# Patient Record
Sex: FEMALE | Race: WHITE | Employment: OTHER | URBAN - NONMETROPOLITAN AREA
[De-identification: names, ages, dates, MRNs, and addresses within clinical notes are randomized per-mention and may not be internally consistent; named-entity substitution may affect disease eponyms.]

---

## 2023-11-23 ENCOUNTER — HOSPITAL ENCOUNTER (EMERGENCY)
Age: 83
Discharge: HOME | End: 2023-11-23
Attending: STUDENT IN AN ORGANIZED HEALTH CARE EDUCATION/TRAINING PROGRAM
Payer: MEDICARE

## 2023-11-23 ENCOUNTER — APPOINTMENT (OUTPATIENT)
Dept: GENERAL RADIOLOGY | Age: 83
End: 2023-11-23
Payer: MEDICARE

## 2023-11-23 VITALS
TEMPERATURE: 97.8 F | HEART RATE: 84 BPM | DIASTOLIC BLOOD PRESSURE: 79 MMHG | RESPIRATION RATE: 18 BRPM | SYSTOLIC BLOOD PRESSURE: 190 MMHG | OXYGEN SATURATION: 96 %

## 2023-11-23 DIAGNOSIS — S52.501A CLOSED FRACTURE OF DISTAL END OF RIGHT RADIUS, UNSPECIFIED FRACTURE MORPHOLOGY, INITIAL ENCOUNTER: Primary | ICD-10-CM

## 2023-11-23 PROCEDURE — 73110 X-RAY EXAM OF WRIST: CPT

## 2023-11-23 PROCEDURE — 99283 EMERGENCY DEPT VISIT LOW MDM: CPT

## 2023-11-23 PROCEDURE — 6370000000 HC RX 637 (ALT 250 FOR IP): Performed by: STUDENT IN AN ORGANIZED HEALTH CARE EDUCATION/TRAINING PROGRAM

## 2023-11-23 RX ORDER — OXYCODONE HYDROCHLORIDE 5 MG/1
5 TABLET ORAL ONCE
Status: COMPLETED | OUTPATIENT
Start: 2023-11-23 | End: 2023-11-23

## 2023-11-23 RX ORDER — ACETAMINOPHEN 500 MG
1000 TABLET ORAL ONCE
Status: COMPLETED | OUTPATIENT
Start: 2023-11-23 | End: 2023-11-23

## 2023-11-23 RX ADMIN — OXYCODONE 5 MG: 5 TABLET ORAL at 21:25

## 2023-11-23 RX ADMIN — OXYCODONE 5 MG: 5 TABLET ORAL at 19:26

## 2023-11-23 RX ADMIN — ACETAMINOPHEN 1000 MG: 500 TABLET ORAL at 19:26

## 2023-11-23 ASSESSMENT — PAIN SCALES - GENERAL
PAINLEVEL_OUTOF10: 7
PAINLEVEL_OUTOF10: 8
PAINLEVEL_OUTOF10: 8

## 2023-11-23 ASSESSMENT — PAIN DESCRIPTION - FREQUENCY: FREQUENCY: CONTINUOUS

## 2023-11-23 ASSESSMENT — PAIN DESCRIPTION - DESCRIPTORS: DESCRIPTORS: ACHING

## 2023-11-23 ASSESSMENT — PAIN DESCRIPTION - PAIN TYPE: TYPE: ACUTE PAIN

## 2023-11-23 ASSESSMENT — ENCOUNTER SYMPTOMS
RESPIRATORY NEGATIVE: 1
GASTROINTESTINAL NEGATIVE: 1

## 2023-11-23 ASSESSMENT — PAIN DESCRIPTION - ORIENTATION: ORIENTATION: RIGHT

## 2023-11-23 ASSESSMENT — PAIN - FUNCTIONAL ASSESSMENT: PAIN_FUNCTIONAL_ASSESSMENT: 0-10

## 2023-11-23 ASSESSMENT — PAIN DESCRIPTION - LOCATION: LOCATION: WRIST

## 2023-11-24 NOTE — DISCHARGE INSTRUCTIONS
You have a fracture of your right wrist.  Please follow-up with an orthopedic surgeon and your primary care doctor once you get back to [place]. If the pain becomes too much to bear please return to [facility] emergency department.

## 2023-11-24 NOTE — ED PROVIDER NOTES
or lose consciousness. Given this, we will x-ray the wrist and pain control and await the results from radiology for treatment. Given the swelling, will have nursing staff place her in a cock-up splint     Amount and/or Complexity of Data Reviewed  Radiology: ordered. Risk  OTC drugs. Prescription drug management. EKG      All EKG's are interpreted by the Emergency Department Physician who either signs or Co-signs this chart in the absence of a cardiologist.    EMERGENCY DEPARTMENT COURSE:      ED Course as of 11/23/23 2119   Thu Nov 23, 2023 2114 Patient does have a distal radius fracture with some palmar angulation. Will have her placed in a wrist cock up splint and have her follow-up with orthopedic surgery back in [place]. [ES]      ED Course User Index  [ES] Agustin Palma MD       PROCEDURES:      CONSULTS:  None    CRITICAL CARE:  There was significant risk of life threatening deterioration of patient's condition requiring my direct management. Critical care time 0 minutes, excluding any documented procedures. FINAL IMPRESSION      1.  Closed fracture of distal end of right radius, unspecified fracture morphology, initial encounter          DISPOSITION / PLAN     DISPOSITION Decision To Discharge 11/23/2023 09:15:01 PM      PATIENT REFERRED TO:  [facility] ED  [address] [facility]  [phone]  Go to   If symptoms worsen      DISCHARGE MEDICATIONS:  New Prescriptions    No medications on file       Agustin Palma MD  Emergency Medicine Physician     (Please note that portions of thisnote were completed with a voice recognition program.  Efforts were made to edit the dictations but occasionally words are mis-transcribed.)       Agustin Palma MD  Resident  11/23/23 2119

## 2024-01-08 ENCOUNTER — HOSPITAL ENCOUNTER (OUTPATIENT)
Dept: OCCUPATIONAL THERAPY | Age: 84
Setting detail: THERAPIES SERIES
Discharge: HOME | End: 2024-01-08
Payer: MEDICARE

## 2024-01-08 PROCEDURE — 97110 THERAPEUTIC EXERCISES: CPT

## 2024-01-08 PROCEDURE — 97140 MANUAL THERAPY 1/> REGIONS: CPT

## 2024-01-08 PROCEDURE — 97166 OT EVAL MOD COMPLEX 45 MIN: CPT

## 2024-01-08 NOTE — PLAN OF CARE
Phone: [phone]                      [facility]           Fax: [phone]                           Outpatient Occupational Therapy                                                                            Initial Evaluation      Name:  Latoya SALVADOR.: [birth year]   Date: 2024  Referring Physician: Dr. Marquez  Medical Diagnosis: S52.551D - Other extraarticular fracture of the lower end of R radius  Rehab Diagnosis/ICD-10#s:Weakness, M62.81; S52.551D - Other extraarticular fracture of the lower end of R radius  Insurance: OT Insurance Information: Medicare  Total # of Visits to Date: 1  Next  Appointment: 4 weeks  Chief Complaint: R wrist fracture and pain  [facility] #: [id number]  Onset Date: 23    Mechanism of Injury: FOOSH  Surgery Date: n/a    Precautions:   [x]None  [] Fall Risk  []WB Status  [] Pacemaker   []Other:            Involved Extremity:      [] Left [x] Right  Dominant: [] Left [x]Right    Work Status:   [] Normal [] Restricted [] Off D/T Injury/Condition [x] Retired [] Unemployed [] Disabled []Other:  Critical Job/Daily Tasks:       Subjective:  Patient presents this date s/p [facility] with R distal radius fracture. Initial injury happened on 23 when she fell while walking. Patient was casted x 5 weeks and now presents with limited motion at wrist and forearm. Patient also still having pain and swelling. Patient concerned as pain is on ulnar wrist side vs. Radial where she fractured. Patient is originally from [place], however, currently staying with sister in [place]. Patient plans to return home in [place], where she lives alone in 2nd floor apartment with no elevator.    PMH: HTN, CA (scalp with removal), OA    Pain: Intensity:   9/10 Location:  R wrist  Pain Type: [x] Constant [x] Intermittent   [  ] with pain meds at rest   [x] With movement/Resistive activity [] With Sedentary activity      Objective:    WRIST AROM Right Left   Wrist Flexion 30 60   Wrist Extension

## 2024-01-10 ENCOUNTER — HOSPITAL ENCOUNTER (OUTPATIENT)
Dept: OCCUPATIONAL THERAPY | Age: 84
Setting detail: THERAPIES SERIES
Discharge: HOME | End: 2024-01-10
Payer: MEDICARE

## 2024-01-10 PROCEDURE — 97140 MANUAL THERAPY 1/> REGIONS: CPT

## 2024-01-10 PROCEDURE — 97022 WHIRLPOOL THERAPY: CPT

## 2024-01-10 PROCEDURE — 97110 THERAPEUTIC EXERCISES: CPT

## 2024-01-10 NOTE — PROGRESS NOTES
assistance  []Needs additional instruction to demonstrate understanding of education    ASSESSMENT  Patient tolerated today’s treatment session:    [x] Good   []  Fair   []  Poor  Limitations/difficulties with treatment session due to:   []Pain     []Fatigue     []Other medical complications     []Other  Goal Assessment: [] No Change    [x]Improved    HEP  Patient is to be completing/ issued the following handouts/exercises since SOC:           HEP Weight/  Level Reps/Time Comments    Putty Yellow 2x/day 10 min sessions Focus mostly on    Contrast baths x 2x/day for 10 min To reduce pain and swelling   Thumb ROM x 2x/day 2 sets of 10 reps with 5 second hold To improve thumb ROM   6 pack x 2x/day 10 reps each To improve thumb ROM with PAB and RAB   Wrist ROM x 2x/day 2 sets of 10 reps with 5 second hold To improve all wrist ROM                   Minutes Tracking:  Time In: 1435  Time Out: 1520  Minutes: 45  Timed Code Treatment Minutes: 43 Minutes        PLAN  [x]Continue with current plan of care  []Medical “Hold”  []I“Hold” per patient request  [] Change Treatment plan:  [] Insurance hold  __ Other        Electronically signed by ELO Smallwood/L,CHT  1/10/2024 5:33 PM

## 2024-01-12 ENCOUNTER — HOSPITAL ENCOUNTER (OUTPATIENT)
Dept: OCCUPATIONAL THERAPY | Age: 84
Setting detail: THERAPIES SERIES
Discharge: HOME | End: 2024-01-12
Payer: MEDICARE

## 2024-01-12 PROCEDURE — 97110 THERAPEUTIC EXERCISES: CPT

## 2024-01-12 PROCEDURE — 97140 MANUAL THERAPY 1/> REGIONS: CPT

## 2024-01-12 PROCEDURE — 97018 PARAFFIN BATH THERAPY: CPT

## 2024-01-12 NOTE — PROGRESS NOTES
Phone: [phone]                 [facility]    Fax: [phone]                       Outpatient Occupational Therapy                 DAILY TREATMENT NOTE    Date: 1/12/2024  Patient’s Name:  Latoya Alex  YOB: 1940 (83 y.o.)  Gender:  female  MRN:  [id number]  [facility] #: [id number]  Medical Diagnosis: S52.551D - Other extraarticular fracture of the lower end of R radius    Referring Physician: Mt Marquez MD     INSURANCE  OT Insurance Information: Medicare       Total # of Visits to Date: 3       PAIN  []No     [x]Yes      Location: R wrist  Pain Rating (0-10 pain scale): 6/10  Pain Description:     SUBJECTIVE   Patient states that she is doing better, things are getting easier for her to do.             Flow Sheet   Exercise /   Manual treatment Weight/  Level Reps/Time Comments    Joint mobs and distractions x x R wrist and digits to for pain and improvement with ROM    PROM c stretch x x R wrist and digits to improve ROM    Maze x x10 R wrist to improve ROM    DTM arch x x10 R hand to improve functional ROM    PRO/SUP wheel x x10 R forearm to improve PRO/SUP   Proprioception x x10 Frisbee with rolling ball to alphabet                                                                                                                                               Modality Flow Sheet:   START STOP Tx Modality       Electrical Stim:          Ultrasound: ___ W/cm2 x ___ mins  Duty factor: __100%  __50%  __20% __10%  Head size:   MHz: __1mHz __2 mHz  __3mHz  Location:      10 minutes Hot Pack: R hand for pain and stiffness; combined c paraffin. Patient tolerated well c skin intact pre and post treatment.     10 minutes  Paraffin:R hand for pain and stiffness; combined c HP. Patient tolerated well c skin intact pre and post treatment.      Fludiotherapy: R hand at moderate heat and agitation for pain and swelling. Patient tolerated well c skin intact pre and post treatment.

## 2024-01-15 ENCOUNTER — HOSPITAL ENCOUNTER (OUTPATIENT)
Dept: OCCUPATIONAL THERAPY | Age: 84
Setting detail: THERAPIES SERIES
Discharge: HOME | End: 2024-01-15
Payer: MEDICARE

## 2024-01-15 PROCEDURE — 97140 MANUAL THERAPY 1/> REGIONS: CPT

## 2024-01-15 PROCEDURE — 97110 THERAPEUTIC EXERCISES: CPT

## 2024-01-15 PROCEDURE — 97018 PARAFFIN BATH THERAPY: CPT

## 2024-01-15 NOTE — PROGRESS NOTES
Phone: [phone]                 [facility]    Fax: [phone]                       Outpatient Occupational Therapy                 DAILY TREATMENT NOTE    Date: 1/15/2024  Patient’s Name:  Latoya Alex  YOB: 1940 (83 y.o.)  Gender:  female  MRN:  [id number]  [facility] #: [id number]  Medical Diagnosis: S52.551D - Other extraarticular fracture of the lower end of R radius    Referring Physician: Mt Marquez MD     INSURANCE  OT Insurance Information: Medicare       Total # of Visits to Date: 4       PAIN  []No     [x]Yes      Location: R wrist  Pain Rating (0-10 pain scale): 6/10  Pain Description:     SUBJECTIVE   Patient states increased pain on dorsal aspect of hand, along 3rd metacarpal             Flow Sheet   Exercise /   Manual treatment Weight/  Level Reps/Time Comments    Joint mobs and distractions x x R wrist and digits to for pain and improvement with ROM    PROM c stretch x x R wrist and digits to improve ROM    Maze x x10 R wrist to improve ROM    DTM arch x x20 R hand to improve functional ROM    PRO/SUP wheel x x20 R forearm to improve PRO/SUP   Proprioception   Frisbee with rolling ball to alphabet                                                                                                                                                                Modality Flow Sheet:   START STOP Tx Modality       Electrical Stim:          Ultrasound: ___ W/cm2 x ___ mins  Duty factor: __100%  __50%  __20% __10%  Head size:   MHz: __1mHz __2 mHz  __3mHz  Location:      10 minutes Hot Pack: R hand for pain and stiffness; combined c paraffin. Patient tolerated well c skin intact pre and post treatment.     10 minutes  Paraffin:R hand for pain and stiffness; combined c HP. Patient tolerated well c skin intact pre and post treatment.       Fludiotherapy: R hand at moderate heat and agitation for pain and swelling. Patient tolerated well c skin intact pre and post treatment.

## 2024-01-17 ENCOUNTER — HOSPITAL ENCOUNTER (OUTPATIENT)
Dept: OCCUPATIONAL THERAPY | Age: 84
Setting detail: THERAPIES SERIES
Discharge: HOME | End: 2024-01-17
Payer: MEDICARE

## 2024-01-17 PROCEDURE — 97140 MANUAL THERAPY 1/> REGIONS: CPT

## 2024-01-17 PROCEDURE — 97110 THERAPEUTIC EXERCISES: CPT

## 2024-01-17 PROCEDURE — 97035 APP MDLTY 1+ULTRASOUND EA 15: CPT

## 2024-01-17 NOTE — PROGRESS NOTES
and stiffness; combined c paraffin. Patient tolerated well c skin intact pre and post treatment.     10 minutes  Paraffin:R hand for pain and stiffness; combined c HP. Patient tolerated well c skin intact pre and post treatment.       Fludiotherapy: R hand at moderate heat and agitation for pain and swelling. Patient tolerated well c skin intact pre and post treatment.       Dry Needling: ___\" needle to superficial radial, deep radial and  lataeral antebrachial cutaneous at homeostatic neuro- trigger points to...   ___No manipulation/static  ___Basic Manipulation  ___Pistoning Manipulation  ___Needle Rotation  ___Tenting                       GOALS   Time Frame for Long Term Goals : 4 weeks       Long Term Goal 1: Patient to state <20% impairment throughout daily tasks, as meausred by the DASH. Continue []Met  [x]Partially met  []Not met   Long Term Goal 2: Patient to improve R forearm supination to 65, wrist flexion to 55, extension to 65, UD to 25 and RD to 20 to improve functional use throughout I/ADL. Continue     UD: 20  RD: 10 []Met  [x]Partially met  []Not met   Long Term Goal 3: Patient to improve R  to 20# to improve independence c daily I/ADL. Continue   R 14#   L 19# []Met  [x]Partially met  []Not met   Long Term Goal 4: Patient to state pain <3/10 at the worst throughout daily tasks to improve QOL. Continue []Met  [x]Partially met  []Not met   Long Term Goal 5: Patient to demonstate decreased edema R wrist to 15.5, thumb to 6.0 and index to 6.2 to improve ROM. Continue  16.5 wrist []Met  [x]Partially met  []Not met   Time Frame for Short Term Goals: 3 weeks       Short Term Goal 1: Patient to be educated on HEP to improve R wrist and hand function. Patient educated on HEP. Continue to update as patient progresses.  [x]Met  []Partially met  []Not met   Short Term Goal 2: Patient to improve R wrist flexion and extension to 50 to improve functional use. Continue     Extension Met []Met  [x]Partially

## 2024-01-19 ENCOUNTER — HOSPITAL ENCOUNTER (OUTPATIENT)
Dept: OCCUPATIONAL THERAPY | Age: 84
Setting detail: THERAPIES SERIES
Discharge: HOME | End: 2024-01-19
Payer: MEDICARE

## 2024-01-19 NOTE — PROGRESS NOTES
[facility]  Inpatient/Observation/Outpatient Rehabilitation    Date: 2024  Patient Name: Latoya Alex       [] Inpatient Acute/Observation       [x]  Outpatient  : 1940       [] Pt no showed for scheduled appointment    [] Pt refused/declined therapy at this time due to:           [x] Pt cancelled due to:  [] No Reason Given   [] Sick/ill   [x] Other: Poor weather conditions    Pt called office to Cx appt.      LALO Steinberg Date: 2024

## 2024-01-22 ENCOUNTER — HOSPITAL ENCOUNTER (OUTPATIENT)
Dept: OCCUPATIONAL THERAPY | Age: 84
Setting detail: THERAPIES SERIES
Discharge: HOME | End: 2024-01-22
Payer: MEDICARE

## 2024-01-22 PROCEDURE — 97018 PARAFFIN BATH THERAPY: CPT

## 2024-01-22 PROCEDURE — 97110 THERAPEUTIC EXERCISES: CPT

## 2024-01-22 PROCEDURE — 97140 MANUAL THERAPY 1/> REGIONS: CPT

## 2024-01-22 NOTE — PROGRESS NOTES
Phone: [phone]                 [facility]    Fax: [phone]                       Outpatient Occupational Therapy                 DAILY TREATMENT NOTE    Date: 1/22/2024  Patient’s Name:  Latoya Alex  YOB: 1940 (83 y.o.)  Gender:  female  MRN:  [id number]  [facility] #: [id number]  Medical Diagnosis: S52.551D - Other extraarticular fracture of the lower end of R radius    Referring Physician: Mt Marquez MD     INSURANCE  OT Insurance Information: Medicare       Total # of Visits to Date: 6       PAIN  []No     [x]Yes      Location: R wrist  Pain Rating (0-10 pain scale): 4/10 with movement  Pain Description:     SUBJECTIVE   Patient states that she drove today, no issues other than unbuckling seatbelt. Also reported that she helped her sister clear out a room, was able to use hand without much pain, however, was very swollen after.             Flow Sheet   Exercise /   Manual treatment Weight/  Level Reps/Time Comments    Joint mobs and distractions x x R wrist and digits to for pain and improvement with ROM    PROM c stretch x x R wrist and digits to improve ROM    Maze   R wrist to improve ROM    DTM arch     R hand to improve functional ROM    PRO/SUP wheel   R forearm to improve PRO/SUP   Proprioception     Frisbee with rolling ball to alphabet     power web Red x20 R wrist strength     flexbar Yellow X10-20 Twists and bends to improve strength    Egg Red X15-20  and pinches to improve strength    Putty Yellow 3' Putty tools to improve strength and functional use.                                                                                                                         Modality Flow Sheet:   START STOP Tx Modality       Electrical Stim:         Ultrasound: __.8_ W/cm2 x __8_ mins  Duty factor: _x_100%  __50%  __20% __10%  Head size:   MHz: __1mHz __2 mHz  _x_3mHz  Location: R wrist, ulnar aspect, and dorsal of hand to reduce pain and swelling, increase

## 2024-01-24 ENCOUNTER — HOSPITAL ENCOUNTER (OUTPATIENT)
Dept: OCCUPATIONAL THERAPY | Age: 84
Setting detail: THERAPIES SERIES
Discharge: HOME | End: 2024-01-24
Payer: MEDICARE

## 2024-01-24 PROCEDURE — 97140 MANUAL THERAPY 1/> REGIONS: CPT

## 2024-01-24 PROCEDURE — 97110 THERAPEUTIC EXERCISES: CPT

## 2024-01-24 PROCEDURE — 97018 PARAFFIN BATH THERAPY: CPT

## 2024-01-24 NOTE — PROGRESS NOTES
minutes Hot Pack: R hand for pain and stiffness; combined c paraffin. Patient tolerated well c skin intact pre and post treatment.     10 minutes  Paraffin:R hand for pain and stiffness; combined c HP. Patient tolerated well c skin intact pre and post treatment.       Fludiotherapy: R hand at moderate heat and agitation for pain and swelling. Patient tolerated well c skin intact pre and post treatment.       Dry Needling: ___\" needle to superficial radial, deep radial and  lataeral antebrachial cutaneous at homeostatic neuro- trigger points to...   ___No manipulation/static  ___Basic Manipulation  ___Pistoning Manipulation  ___Needle Rotation  ___Tenting                       GOALS   Time Frame for Long Term Goals : 4 weeks       Long Term Goal 1: Patient to state <20% impairment throughout daily tasks, as meausred by the DASH. Continue []Met  [x]Partially met  []Not met   Long Term Goal 2: Patient to improve R forearm supination to 65, wrist flexion to 55, extension to 65, UD to 25 and RD to 20 to improve functional use throughout I/ADL. Continue  Flexion: 44  Extension: 61  UD: 35 - MET  RD: 17  SUP: 60    []Met  [x]Partially met  []Not met   Long Term Goal 3: Patient to improve R  to 20# to improve independence c daily I/ADL. Continue     []Met  [x]Partially met  []Not met   Long Term Goal 4: Patient to state pain <3/10 at the worst throughout daily tasks to improve QOL. Continue []Met  [x]Partially met  []Not met   Long Term Goal 5: Patient to demonstate decreased edema R wrist to 15.5, thumb to 6.0 and index to 6.2 to improve ROM. Continue   []Met  [x]Partially met  []Not met   Time Frame for Short Term Goals: 3 weeks       Short Term Goal 1: Patient to be educated on HEP to improve R wrist and hand function. Patient educated on HEP. Continue to update as patient progresses.  [x]Met  []Partially met  []Not met   Short Term Goal 2: Patient to improve R wrist flexion and extension to 50 to improve functional

## 2024-01-26 ENCOUNTER — HOSPITAL ENCOUNTER (OUTPATIENT)
Dept: OCCUPATIONAL THERAPY | Age: 84
Setting detail: THERAPIES SERIES
Discharge: HOME | End: 2024-01-26
Payer: MEDICARE

## 2024-01-26 PROCEDURE — 97018 PARAFFIN BATH THERAPY: CPT

## 2024-01-26 PROCEDURE — 97140 MANUAL THERAPY 1/> REGIONS: CPT

## 2024-01-26 PROCEDURE — 97110 THERAPEUTIC EXERCISES: CPT

## 2024-01-26 NOTE — PROGRESS NOTES
to improve R wrist and hand function. Patient educated on HEP. Continue to update as patient progresses.  [x]Met  []Partially met  []Not met   Short Term Goal 2: Patient to improve R wrist flexion and extension to 50 to improve functional use. Continue  Flexion: 44  Extension Met []Met  [x]Partially met  []Not met   Short Term Goal 3: Patient to improve forearm supination to 55 to improve ROM for functional use. Met    [x]Met  []Partially met  []Not met   Short Term Goal 4: Patient to improve R thumb PAB and RAB to 11.0 cm to improve ROM for incraesed independence c ADL. Met    [x]Met  []Partially met  []Not met   ADDITIONAL COMMENTS             EDUCATION  New Education provided to patient/family/caregiver:    [x]Yes:     []No (Continued review of prior education)   If yes Education Provided: wrist HEP x 3 planes with bottle of water    Method of Education:     [x]Discussion     [x]Demonstration    [] Written     []Other  Evaluation of Patient’s Response to Education:         []Patient and or caregiver verbalized understanding  []Patient and or Caregiver Demonstrated without assistance   [x]Patient and or Caregiver Demonstrated with assistance  []Needs additional instruction to demonstrate understanding of education    ASSESSMENT  Patient tolerated today’s treatment session:    [x] Good   []  Fair   []  Poor  Limitations/difficulties with treatment session due to:   []Pain     []Fatigue     []Other medical complications     []Other  Goal Assessment: [] No Change    [x]Improved      Therapists observations or comments: Improvements noticed in fxl use    HEP  Patient is to be completing/ issued the following handouts/exercises since SOC:           HEP Weight/  Level Reps/Time Comments    Putty Yellow 2x/day 10 min sessions Focus mostly on    Contrast baths x 2x/day for 10 min To reduce pain and swelling   Thumb ROM x 2x/day 2 sets of 10 reps with 5 second hold To improve thumb ROM   6 pack x 2x/day 10 reps each To

## 2024-01-29 ENCOUNTER — HOSPITAL ENCOUNTER (OUTPATIENT)
Dept: OCCUPATIONAL THERAPY | Age: 84
Setting detail: THERAPIES SERIES
Discharge: HOME | End: 2024-01-29
Payer: MEDICARE

## 2024-01-29 PROCEDURE — 97018 PARAFFIN BATH THERAPY: CPT

## 2024-01-29 PROCEDURE — 97140 MANUAL THERAPY 1/> REGIONS: CPT

## 2024-01-29 PROCEDURE — 97110 THERAPEUTIC EXERCISES: CPT

## 2024-01-29 NOTE — PROGRESS NOTES
assist with pulling herself up stairs. Trialed this date to monitor pain and strength. Patient stated minimal pain, completed x 4 steps with CGA.                                                                                          Modality Flow Sheet:   START STOP Tx Modality       Electrical Stim:          Ultrasound: __.8_ W/cm2 x __8_ mins  Duty factor: _x_100%  __50%  __20% __10%  Head size:   MHz: __1mHz __2 mHz  _x_3mHz  Location: R wrist, ulnar aspect, and dorsal of hand to reduce pain and swelling, increase ease of fxl tasks. Pt tolerated well with skin intact pre and post      10 minutes Hot Pack: R hand for pain and stiffness; combined c paraffin. Patient tolerated well c skin intact pre and post treatment.     10 minutes  Paraffin:R hand for pain and stiffness; combined c HP. Patient tolerated well c skin intact pre and post treatment.       Fludiotherapy: R hand at moderate heat and agitation for pain and swelling. Patient tolerated well c skin intact pre and post treatment.       Dry Needling: ___\" needle to superficial radial, deep radial and  lataeral antebrachial cutaneous at homeostatic neuro- trigger points to...   ___No manipulation/static  ___Basic Manipulation  ___Pistoning Manipulation  ___Needle Rotation  ___Tenting                       GOALS   Time Frame for Long Term Goals : 4 weeks       Long Term Goal 1: Patient to state <20% impairment throughout daily tasks, as meausred by the DASH. Continue []Met  [x]Partially met  []Not met   Long Term Goal 2: Patient to improve R forearm supination to 65, wrist flexion to 55, extension to 65, UD to 25 and RD to 20 to improve functional use throughout I/ADL. Continue  Flexion: 44  Extension: 61  UD: 35 - MET  RD: 17  SUP: 60    []Met  [x]Partially met  []Not met   Long Term Goal 3: Patient to improve R  to 20# to improve independence c daily I/ADL. Continue     []Met  [x]Partially met  []Not met   Long Term Goal 4: Patient to state pain <3/10

## 2024-01-31 ENCOUNTER — HOSPITAL ENCOUNTER (OUTPATIENT)
Dept: OCCUPATIONAL THERAPY | Age: 84
Setting detail: THERAPIES SERIES
Discharge: HOME | End: 2024-01-31
Payer: MEDICARE

## 2024-01-31 PROCEDURE — 97110 THERAPEUTIC EXERCISES: CPT

## 2024-01-31 PROCEDURE — 97018 PARAFFIN BATH THERAPY: CPT

## 2024-01-31 PROCEDURE — 97140 MANUAL THERAPY 1/> REGIONS: CPT

## 2024-01-31 NOTE — PROGRESS NOTES
Phone: [phone]                 [facility]    Fax: [phone]                       Outpatient Occupational Therapy                 DAILY TREATMENT NOTE    Date: 1/31/2024  Patient’s Name:  Latoya Alex  YOB: 1940 (83 y.o.)  Gender:  female  MRN:  [id number]  [facility] #: [id number]  Medical Diagnosis: S52.551D - Other extraarticular fracture of the lower end of R radius    Referring Physician: Mt Marquez MD     INSURANCE  OT Insurance Information: Medicare       Total # of Visits to Date: 10       PAIN  []No     [x]Yes      Location: R wrist  Pain Rating (0-10 pain scale): 3/10  Pain Description:     SUBJECTIVE   Patient states that she did some hand exercises with her sister and may have slightly overdid it, making pain increase to 3/10.                Flow Sheet   Exercise /   Manual treatment Weight/  Level Reps/Time Comments    Joint mobs and distractions x x R wrist and digits to for pain and improvement with ROM    PROM c stretch x x R wrist and digits to improve ROM    Maze     R wrist to improve ROM    DTM arch     R hand to improve functional ROM    PRO/SUP wheel     R forearm to improve PRO/SUP   Proprioception     Frisbee with rolling ball      power web Red        Green X20        x20 MP flexion and extension to improve strength     Intrinsic (-) in pronation and supination to improve strength - elbow supported.     flexbar Orange X20 Twists and bends to improve strength    Egg   DUckbill,  and pinches to improve strength    Putty  Red X5 minutes Putty tools to improve functional strength    Squigz     Removal from table to improve strength    free weight     Tied to T band to simulate opening car door       Arm on bolster x 3 planes wrist to increase strength    WB on stair well x x Patient currently living at [facility], plans to return home to apartment where she will have to navigate stairs.     Trialed again this date. Patient to use cane in R when asending

## 2024-02-02 ENCOUNTER — HOSPITAL ENCOUNTER (OUTPATIENT)
Dept: OCCUPATIONAL THERAPY | Age: 84
Setting detail: THERAPIES SERIES
Discharge: HOME | End: 2024-02-02
Payer: MEDICARE

## 2024-02-02 PROCEDURE — 97018 PARAFFIN BATH THERAPY: CPT

## 2024-02-02 PROCEDURE — 97110 THERAPEUTIC EXERCISES: CPT

## 2024-02-02 NOTE — PROGRESS NOTES
Phone: [phone]                 [facility]    Fax: [phone]                       Outpatient Occupational Therapy                 DAILY TREATMENT NOTE    Date: 2/2/2024  Patient’s Name:  Latoya Alex  YOB: 1940 (83 y.o.)  Gender:  female  MRN:  [id number]  [facility] #: [id number]  Medical Diagnosis: S52.551D - Other extraarticular fracture of the lower end of R radius    Referring Physician: Mt Marquez MD     INSURANCE  OT Insurance Information: Medicare       Total # of Visits to Date: 11       PAIN  []No     [x]Yes      Location: R wrist  Pain Rating (0-10 pain scale): 1/10  Pain Description:     SUBJECTIVE   Has dr dorado next Tuesday.                 Flow Sheet   Exercise /   Manual treatment Weight/  Level Reps/Time Comments    Joint mobs and distractions x x R wrist and digits to for pain and improvement with ROM    PROM c stretch x x R wrist and digits to improve ROM    Maze     R wrist to improve ROM    DTM arch     R hand to improve functional ROM    PRO/SUP wheel     R forearm to improve PRO/SUP   Proprioception     Frisbee with rolling ball      power web   MP flexion and extension to improve strength     Intrinsic (-) in pronation and supination to improve strength - elbow supported.     flexbar   Twists and bends to improve strength    Egg     DUckbill,  and pinches to improve strength    Putty  Red X8 minutes Putty tools to improve functional strength    med bottles x x Max difficulty - issued gripper    free weight     Tied to T band to simulate opening car door       Arm on bolster x 3 planes wrist to increase strength    WB on stair well x x Patient currently living at sisters, plans to return home to apartment where she will have to navigate stairs.     Trialed again this date. Patient to use cane in R when asending and L when desending. Completed full flight.

## 2024-02-06 ENCOUNTER — HOSPITAL ENCOUNTER (OUTPATIENT)
Dept: OCCUPATIONAL THERAPY | Age: 84
Setting detail: THERAPIES SERIES
Discharge: HOME | End: 2024-02-06
Payer: MEDICARE

## 2024-02-06 PROCEDURE — 97530 THERAPEUTIC ACTIVITIES: CPT

## 2024-02-06 PROCEDURE — 97018 PARAFFIN BATH THERAPY: CPT

## 2024-02-06 PROCEDURE — 97140 MANUAL THERAPY 1/> REGIONS: CPT

## 2024-02-06 PROCEDURE — 97110 THERAPEUTIC EXERCISES: CPT

## 2024-02-06 NOTE — PROGRESS NOTES
Occupational Therapy  Phone: [phone]                 [facility]    Fax: [phone]                       Outpatient Occupational Therapy                 DAILY TREATMENT NOTE    Date: 2/6/2024  Patient’s Name:  Latoya Alex  YOB: 1940 (83 y.o.)  Gender:  female  MRN:  [id number]  [facility] #: [id number]  Medical Diagnosis: S52.551D - Other extraarticular fracture of the lower end of R radius    Referring Physician: Mt Marquez MD     INSURANCE  OT Insurance Information: Medicare       Total # of Visits to Date: 12       PAIN  []No     [x]Yes      Location: R wrist   Pain Rating (0-10 pain scale): 1-2/10  Pain Description:     SUBJECTIVE   Dr gave her permission to return home to [place] if she would like      Flow Sheet   Exercise /   Manual treatment Weight/  Level Reps/Time Comments    Joint mobs and distractions x x R wrist and digits to for pain and improvement with ROM    PROM c stretch x x R wrist and digits to improve ROM    Maze     R wrist to improve ROM    DTM arch     R hand to improve functional ROM    PRO/SUP wheel     R forearm to improve PRO/SUP   Proprioception     [place] with rolling ball      power web     MP flexion and extension to improve strength     Intrinsic (-) in pronation and supination to improve strength - elbow supported.     flexbar     Twists and bends to improve strength    Egg     DUckbill,  and pinches to improve strength    Putty  Red X8 minutes Putty tools to improve functional strength    med bottles x x Max difficulty - issued gripper    free weight     Tied to T band to simulate opening car door       Arm on bolster x 3 planes wrist to increase strength    WB on stair well x x Patient currently living at [facility], plans to return home to apartment where she will have to navigate stairs.     Trialed again this date. Patient to use cane in R when asending and L when desending. Completed full flight.

## 2024-02-07 ENCOUNTER — APPOINTMENT (OUTPATIENT)
Dept: OCCUPATIONAL THERAPY | Age: 84
End: 2024-02-07
Payer: MEDICARE